# Patient Record
Sex: MALE | Race: BLACK OR AFRICAN AMERICAN | NOT HISPANIC OR LATINO | Employment: FULL TIME | URBAN - METROPOLITAN AREA
[De-identification: names, ages, dates, MRNs, and addresses within clinical notes are randomized per-mention and may not be internally consistent; named-entity substitution may affect disease eponyms.]

---

## 2024-09-02 ENCOUNTER — HOSPITAL ENCOUNTER (EMERGENCY)
Facility: HOSPITAL | Age: 49
Discharge: HOME | End: 2024-09-02
Attending: STUDENT IN AN ORGANIZED HEALTH CARE EDUCATION/TRAINING PROGRAM

## 2024-09-02 ENCOUNTER — HOSPITAL ENCOUNTER (OUTPATIENT)
Dept: CARDIOLOGY | Facility: HOSPITAL | Age: 49
Discharge: HOME | End: 2024-09-02

## 2024-09-02 ENCOUNTER — APPOINTMENT (OUTPATIENT)
Dept: RADIOLOGY | Facility: HOSPITAL | Age: 49
End: 2024-09-02

## 2024-09-02 VITALS
SYSTOLIC BLOOD PRESSURE: 122 MMHG | BODY MASS INDEX: 26.79 KG/M2 | HEART RATE: 54 BPM | OXYGEN SATURATION: 100 % | DIASTOLIC BLOOD PRESSURE: 84 MMHG | WEIGHT: 220 LBS | RESPIRATION RATE: 16 BRPM | HEIGHT: 76 IN | TEMPERATURE: 97.9 F

## 2024-09-02 DIAGNOSIS — R07.9 CHEST PAIN, UNSPECIFIED TYPE: Primary | ICD-10-CM

## 2024-09-02 LAB
ALBUMIN SERPL BCP-MCNC: 3.9 G/DL (ref 3.4–5)
ALP SERPL-CCNC: 54 U/L (ref 33–120)
ALT SERPL W P-5'-P-CCNC: 13 U/L (ref 10–52)
ANION GAP SERPL CALC-SCNC: 11 MMOL/L (ref 10–20)
AST SERPL W P-5'-P-CCNC: 26 U/L (ref 9–39)
BASOPHILS # BLD AUTO: 0.02 X10*3/UL (ref 0–0.1)
BASOPHILS NFR BLD AUTO: 0.4 %
BILIRUB SERPL-MCNC: 0.5 MG/DL (ref 0–1.2)
BNP SERPL-MCNC: 23 PG/ML (ref 0–99)
BUN SERPL-MCNC: 9 MG/DL (ref 6–23)
CALCIUM SERPL-MCNC: 8.9 MG/DL (ref 8.6–10.3)
CARDIAC TROPONIN I PNL SERPL HS: 6 NG/L (ref 0–20)
CARDIAC TROPONIN I PNL SERPL HS: 6 NG/L (ref 0–20)
CHLORIDE SERPL-SCNC: 103 MMOL/L (ref 98–107)
CO2 SERPL-SCNC: 29 MMOL/L (ref 21–32)
CREAT SERPL-MCNC: 1.03 MG/DL (ref 0.5–1.3)
EGFRCR SERPLBLD CKD-EPI 2021: 90 ML/MIN/1.73M*2
EOSINOPHIL # BLD AUTO: 0.11 X10*3/UL (ref 0–0.7)
EOSINOPHIL NFR BLD AUTO: 2 %
ERYTHROCYTE [DISTWIDTH] IN BLOOD BY AUTOMATED COUNT: 12.3 % (ref 11.5–14.5)
GLUCOSE SERPL-MCNC: 84 MG/DL (ref 74–99)
HCT VFR BLD AUTO: 43.7 % (ref 41–52)
HGB BLD-MCNC: 14.9 G/DL (ref 13.5–17.5)
IMM GRANULOCYTES # BLD AUTO: 0.02 X10*3/UL (ref 0–0.7)
IMM GRANULOCYTES NFR BLD AUTO: 0.4 % (ref 0–0.9)
INR PPP: 1.1 (ref 0.9–1.1)
LYMPHOCYTES # BLD AUTO: 1.93 X10*3/UL (ref 1.2–4.8)
LYMPHOCYTES NFR BLD AUTO: 34.6 %
MAGNESIUM SERPL-MCNC: 1.9 MG/DL (ref 1.6–2.4)
MCH RBC QN AUTO: 28.7 PG (ref 26–34)
MCHC RBC AUTO-ENTMCNC: 34.1 G/DL (ref 32–36)
MCV RBC AUTO: 84 FL (ref 80–100)
MONOCYTES # BLD AUTO: 0.44 X10*3/UL (ref 0.1–1)
MONOCYTES NFR BLD AUTO: 7.9 %
NEUTROPHILS # BLD AUTO: 3.06 X10*3/UL (ref 1.2–7.7)
NEUTROPHILS NFR BLD AUTO: 54.7 %
NRBC BLD-RTO: 0 /100 WBCS (ref 0–0)
PLATELET # BLD AUTO: 182 X10*3/UL (ref 150–450)
POTASSIUM SERPL-SCNC: 4 MMOL/L (ref 3.5–5.3)
PROT SERPL-MCNC: 6.9 G/DL (ref 6.4–8.2)
PROTHROMBIN TIME: 12.3 SECONDS (ref 9.8–12.8)
RBC # BLD AUTO: 5.2 X10*6/UL (ref 4.5–5.9)
SARS-COV-2 RNA RESP QL NAA+PROBE: NOT DETECTED
SODIUM SERPL-SCNC: 139 MMOL/L (ref 136–145)
WBC # BLD AUTO: 5.6 X10*3/UL (ref 4.4–11.3)

## 2024-09-02 PROCEDURE — 85025 COMPLETE CBC W/AUTO DIFF WBC: CPT

## 2024-09-02 PROCEDURE — 85610 PROTHROMBIN TIME: CPT

## 2024-09-02 PROCEDURE — 71045 X-RAY EXAM CHEST 1 VIEW: CPT | Performed by: RADIOLOGY

## 2024-09-02 PROCEDURE — 87635 SARS-COV-2 COVID-19 AMP PRB: CPT

## 2024-09-02 PROCEDURE — 71045 X-RAY EXAM CHEST 1 VIEW: CPT

## 2024-09-02 PROCEDURE — 99284 EMERGENCY DEPT VISIT MOD MDM: CPT | Mod: 25

## 2024-09-02 PROCEDURE — 96374 THER/PROPH/DIAG INJ IV PUSH: CPT

## 2024-09-02 PROCEDURE — 93005 ELECTROCARDIOGRAM TRACING: CPT

## 2024-09-02 PROCEDURE — 84484 ASSAY OF TROPONIN QUANT: CPT

## 2024-09-02 PROCEDURE — 2500000004 HC RX 250 GENERAL PHARMACY W/ HCPCS (ALT 636 FOR OP/ED)

## 2024-09-02 PROCEDURE — 36415 COLL VENOUS BLD VENIPUNCTURE: CPT

## 2024-09-02 PROCEDURE — 80053 COMPREHEN METABOLIC PANEL: CPT

## 2024-09-02 PROCEDURE — 83880 ASSAY OF NATRIURETIC PEPTIDE: CPT

## 2024-09-02 PROCEDURE — 83735 ASSAY OF MAGNESIUM: CPT

## 2024-09-02 RX ORDER — KETOROLAC TROMETHAMINE 30 MG/ML
15 INJECTION, SOLUTION INTRAMUSCULAR; INTRAVENOUS ONCE
Status: COMPLETED | OUTPATIENT
Start: 2024-09-02 | End: 2024-09-02

## 2024-09-02 RX ORDER — ACETAMINOPHEN 325 MG/1
975 TABLET ORAL ONCE
Status: COMPLETED | OUTPATIENT
Start: 2024-09-02 | End: 2024-09-02

## 2024-09-02 ASSESSMENT — PAIN - FUNCTIONAL ASSESSMENT
PAIN_FUNCTIONAL_ASSESSMENT: 0-10
PAIN_FUNCTIONAL_ASSESSMENT: 0-10

## 2024-09-02 ASSESSMENT — COLUMBIA-SUICIDE SEVERITY RATING SCALE - C-SSRS
2. HAVE YOU ACTUALLY HAD ANY THOUGHTS OF KILLING YOURSELF?: NO
1. IN THE PAST MONTH, HAVE YOU WISHED YOU WERE DEAD OR WISHED YOU COULD GO TO SLEEP AND NOT WAKE UP?: NO
6. HAVE YOU EVER DONE ANYTHING, STARTED TO DO ANYTHING, OR PREPARED TO DO ANYTHING TO END YOUR LIFE?: NO

## 2024-09-02 ASSESSMENT — PAIN DESCRIPTION - LOCATION: LOCATION: CHEST

## 2024-09-02 ASSESSMENT — PAIN DESCRIPTION - PAIN TYPE: TYPE: ACUTE PAIN

## 2024-09-02 ASSESSMENT — PAIN SCALES - GENERAL
PAINLEVEL_OUTOF10: 0 - NO PAIN
PAINLEVEL_OUTOF10: 7

## 2024-09-02 ASSESSMENT — PAIN DESCRIPTION - DESCRIPTORS: DESCRIPTORS: TIGHTNESS

## 2024-09-02 NOTE — Clinical Note
Howard Blevins was seen and treated in our emergency department on 9/2/2024.  He may return to work on 09/04/2024.       If you have any questions or concerns, please don't hesitate to call.      Ana Martinez PA-C

## 2024-09-02 NOTE — ED PROVIDER NOTES
HPI   Chief Complaint   Patient presents with    Chest Pain       HPI  HISTORY OF PRESENT ILLNESS:  49 y.o. male presenting to the ED with complaint of chest pain and shortness of breath that began yesterday.  He reports midsternal chest tightness that has been intermittent since yesterday, seems to come and go randomly.  Denies any specific triggers, no specific alleviating or aggravating factors.  Not specifically worse with exertion such as walking distances or climbing stairs.  Not improved or worsened with position changes such as lying flat or sitting forward.  Not worsened with deep breaths, not pleuritic in nature.  He also reports feeling short of breath, feels like he cannot catch his breath, this has also been intermittent since yesterday.  Also reports a frontal aching headache that began yesterday, relatively mild, not thunderclap in onset, not the worst headache of his life.  No neck pain or stiffness.  No fevers or chills.  No dizziness or lightheadedness, no syncope.  No extremity numbness, tingling, or weakness.  No gait instability.  No confusion.  No difficulty speech or swallowing.  No blurry vision or vision loss.  He denies a cough or hemoptysis.  No lower extremity pain or swelling.  No nasal congestion or sore throat.  No abdominal pain, nausea, vomiting, or diarrhea.  No back or flank pain.  No medications taken at home for his symptoms.  He states that he had similar symptoms about a year ago, states that he was seen in the ED at that time, and was told everything was normal, and symptoms spontaneously resolved.  Patient does note multiple stressors at home and work recently, thinks this may be related to his current symptoms.  No other complaints or symptoms voiced.    12 point review of systems was performed and is negative unless otherwise specified in HPI.    PMH: HTN, HLD, VALERIE, BPH, cervical radiculopathy  Social history: non smoker, occasional ETOH, no illicit substances  Physical  Exam   ED Triage Vitals [09/02/24 0713]   Temperature Heart Rate Respirations BP   36.9 °C (98.5 °F) 72 18 (!) 129/92      Pulse Ox Temp Source Heart Rate Source Patient Position   100 % Temporal -- --      BP Location FiO2 (%)     -- --       Physical Exam  Constitutional:       General: He is not in acute distress.     Appearance: He is not ill-appearing, toxic-appearing or diaphoretic.   HENT:      Head: Normocephalic and atraumatic.      Nose: No congestion.      Mouth/Throat:      Mouth: Mucous membranes are moist.   Eyes:      General: No scleral icterus.     Extraocular Movements: Extraocular movements intact.      Pupils: Pupils are equal, round, and reactive to light.   Neck:      Vascular: No JVD.   Cardiovascular:      Rate and Rhythm: Normal rate and regular rhythm.      Pulses: Normal pulses.           Radial pulses are 2+ on the right side and 2+ on the left side.        Dorsalis pedis pulses are 2+ on the right side and 2+ on the left side.   Pulmonary:      Effort: Pulmonary effort is normal. No tachypnea or respiratory distress.      Breath sounds: Normal breath sounds. No stridor. No decreased breath sounds, wheezing, rhonchi or rales.   Abdominal:      General: There is no distension.      Palpations: Abdomen is soft.   Musculoskeletal:         General: Normal range of motion.      Cervical back: Normal range of motion and neck supple. No rigidity.      Right lower leg: No tenderness. No edema.      Left lower leg: No tenderness. No edema.   Skin:     General: Skin is warm and dry.      Capillary Refill: Capillary refill takes less than 2 seconds.   Neurological:      General: No focal deficit present.      Mental Status: He is alert and oriented to person, place, and time.      GCS: GCS eye subscore is 4. GCS verbal subscore is 5. GCS motor subscore is 6.      Cranial Nerves: Cranial nerves 2-12 are intact. No cranial nerve deficit, dysarthria or facial asymmetry.      Sensory: Sensation is  intact. No sensory deficit.      Motor: Motor function is intact. No weakness.      Coordination: Coordination is intact.      Gait: Gait is intact. Gait normal.   Psychiatric:         Mood and Affect: Mood normal.         Behavior: Behavior normal.         Judgment: Judgment normal.     ED Course & MDM   ED Course as of 09/04/24 1007   Mon Sep 02, 2024   0730 External chart review:  IM office visit May 2024  Notes hx HTN     [SS]   0733 07:11 12 lead EKG interpreted by myself and my ED attending reveals sinus rhythm with a rate of 59 beats per minute.  Normal Axis. Mild ST elevations in II, III, aVF. No ST elevations. T wave inversions in V1. No acute ischemic changes identified.  [EH]   0909 0849 12 lead EKG interpreted by myself and my ED attending reveals sinus rhythm with a rate of 55 beats per minute.  Early repolarization. Similar mild ST elevations in II, III, aVF. No ST depressions. T wave inversions in V1. No acute ischemic changes identified.  [EH]      ED Course User Index  [EH] Ana Martinez PA-C  [] Steffen Simerlink, MD         Diagnoses as of 09/04/24 1007   Chest pain, unspecified type     Medical Decision Making  ED course / MDM     Summary:  Patient presented with intermittent midsternal chest tightness, intermittent dyspnea, and headache that began yesterday.  Reports some life stressors at home and work, denies any other known possible triggering factors.  Vital signs are stable, patient is overall very well-appearing, ambulates unassisted, no acute distress.  Lungs are clear to auscultation, heart regular rate and rhythm.  No peripheral edema or tenderness.  No motor or sensory deficits of the upper or lower extremities, normal pupils and EOMs, no ataxia, no meningeal signs or nuchal rigidity.  No fevers or chills.  IV established, labs drawn.  Cardiac workup initiated.  Labs are reassuring, no leukocytosis, normal hemoglobin, no electrolyte abnormalities, normal kidney and liver function.   Negative COVID swab, normal BNP.  Chest x-ray shows no acute process. Initial EKG nonischemic, does show some mild ST elevations in inferior leads.  Repeat EKG unchanged.  Troponin negative x2.  Low suspicion for ACS or MI.  Patient given a dose of Tylenol and Toradol in the ED, which significantly improved his symptoms.   Patient case discussed with ED attending Dr. Simerlink, who also saw and evaluated the patient. Results and differential were discussed in detail with the patient.  He has had a reassuring workup, headache has resolved, vitals are stable.  Discussed the option for admission for chest pain rule out, but patient prefers discharge, which we do agree is appropriate.  He was advised to follow-up with his PCP within the next 2 to 3 days, he is also given information for outpatient follow-up with cardiology.  Will return to the ED for any new or worsening symptoms.  Patient has a PERC score of 0, per this criteria does not require further workup to rule out a PE, which is consistent with my clinical impression.  No ripping or tearing chest pain, normal mediastinum on chest x-ray, very low suspicion for dissection.  No neck pain, no meningeal signs or nuchal rigidity, no fevers or chills, headache not thunderclap in onset, not the worst headache of his life, therefore very low suspicion for meningitis or SAH, low suspicion for any acute neurologic pathology as cause of his headache, patient agrees further workup such as head CT, MRI, or LP is not indicated at this time.  Patient was given strict return precautions, understands reasons to return to the ED. Also discussed supportive care instructions. I expressed the importance of outpatient follow up with their PCP. All questions were answered, patient expressed understanding and stated that they would comply.    Impression:  1. See diagnosis     Disposition: Discharge    Patient seen and discussed with Dr. Simerlink    This note has been transcribed using  voice recognition and may contain grammatical errors, misplaced words, incorrect words, incorrect phrases or other errors.   Procedure  Procedures     Ana Martinez PA-C  09/02/24 0938    Emergency Medicine Attending Attestation:     This patient was seen by the advanced practice provider.  I have personally performed a substantive portion of the encounter.  I have seen and examined the patient; agree with the workup, evaluation, MDM, management and diagnosis.  The care plan has been discussed.      I personally saw the patient and made/approved the management plan and take responsibility for the patient management.    History: As above, patient presents with intermittent chest pain. Not exertional or pleuritic. Not associated with SOB, diaphoresis, or nausea. No prior cardiac hx. No prior hx of DVT/PE, no recent surgery, travel, immobilization, or hormone therapy.   Exam:   Constitutional: Awake, alert, not in acute distress  HENT: Head atraumatic, mucous membranes moist  Eyes:  Conjunctivae normal  Neck:  Supple  Lungs: Clear to auscultation, breath sounds equal and symmetric, no wheezes, rales, or rhonchi  Heart: Regular rate and rhythm, no murmur, rub or gallop, 2+ radial and DP pulses bilaterally  Abdomen: Soft, nontender, nondistended, no rebound or guarding  Extremities: No lower extremity edema  Neuro: Alert, no focal deficit  Skin: Warm, dry  Psych: Calm, cooperative  MDM:   Patient presents with atypical chest pain. Workup reassuring. Low risk by HEART score, pt appropriate for DC with outpt follow up. Discharged in stable condition.     External Chart Review: See ED Course  Independent Test Interpretation: See ED Course or Below Text  Data Personally Interpreted: CBC: no anemia, BMP: no clinically significant electrolyte abnormalities, no CHAYO LFT: no evidence of obstructive biliary pathology, no evidence of acute liver injury, high sensitivity troponin not elevated, doubt ACS, covid/flu negative, CXR  without evidence of PTX, PNA, widened mediastinum, or pulmonary edema.  I have personally reviewed and interpreted the EKG obtained.  Sinus bradycardia, rate 59 BPM  Normal axis  MD interval and QRS duration within normal limits.  QTc within normal limits.  Nonspecific ST changes in pattern of early repolarization  No signs of acute ischemia or infarction       ED Course as of 09/04/24 1007   Mon Sep 02, 2024   0730 External chart review:  IM office visit May 2024  Notes hx HTN     [SS]   0733 07:11 12 lead EKG interpreted by myself and my ED attending reveals sinus rhythm with a rate of 59 beats per minute.  Normal Axis. Mild ST elevations in II, III, aVF. No ST elevations. T wave inversions in V1. No acute ischemic changes identified.  [EH]   0909 9435 12 lead EKG interpreted by myself and my ED attending reveals sinus rhythm with a rate of 55 beats per minute.  Early repolarization. Similar mild ST elevations in II, III, aVF. No ST depressions. T wave inversions in V1. No acute ischemic changes identified.  [EH]      ED Course User Index  [EH] Ana Martinez PA-C  [SS] Steffen Simerlink, MD         Diagnoses as of 09/04/24 1007   Chest pain, unspecified type       Steffen Simerlink, MD Steffen Simerlink, MD  09/04/24 1007

## 2024-09-02 NOTE — ED TRIAGE NOTES
Pt presents to the ED this morning from home with chest pain and SOB. Pt states that it started yesterday and he was not doing anything prior to it starting. Pt states this has happened before a while ago, but he has a history of high blood pressure. Pt denies being sick recently, no hx of asthma.

## 2024-09-03 ENCOUNTER — APPOINTMENT (OUTPATIENT)
Dept: CARDIOLOGY | Facility: HOSPITAL | Age: 49
End: 2024-09-03

## 2024-09-03 LAB
ATRIAL RATE: 55 BPM
ATRIAL RATE: 59 BPM
P AXIS: 46 DEGREES
P AXIS: 48 DEGREES
P OFFSET: 176 MS
P OFFSET: 178 MS
P ONSET: 123 MS
P ONSET: 133 MS
PR INTERVAL: 172 MS
PR INTERVAL: 188 MS
Q ONSET: 217 MS
Q ONSET: 219 MS
QRS COUNT: 9 BEATS
QRS COUNT: 9 BEATS
QRS DURATION: 92 MS
QRS DURATION: 94 MS
QT INTERVAL: 418 MS
QT INTERVAL: 436 MS
QTC CALCULATION(BAZETT): 413 MS
QTC CALCULATION(BAZETT): 417 MS
QTC FREDERICIA: 415 MS
QTC FREDERICIA: 423 MS
R AXIS: 64 DEGREES
R AXIS: 68 DEGREES
T AXIS: 51 DEGREES
T AXIS: 52 DEGREES
T OFFSET: 428 MS
T OFFSET: 435 MS
VENTRICULAR RATE: 55 BPM
VENTRICULAR RATE: 59 BPM